# Patient Record
Sex: FEMALE | Race: WHITE | URBAN - METROPOLITAN AREA
[De-identification: names, ages, dates, MRNs, and addresses within clinical notes are randomized per-mention and may not be internally consistent; named-entity substitution may affect disease eponyms.]

---

## 2017-11-15 ENCOUNTER — HOSPITAL ENCOUNTER (OUTPATIENT)
Dept: LAB | Age: 62
Discharge: HOME | End: 2017-11-15
Payer: MEDICAID

## 2017-11-15 ENCOUNTER — OFFICE VISIT (OUTPATIENT)
Dept: FAMILY MEDICINE CLINIC | Age: 62
End: 2017-11-15

## 2017-11-15 VITALS
BODY MASS INDEX: 39.65 KG/M2 | HEIGHT: 61 IN | RESPIRATION RATE: 16 BRPM | DIASTOLIC BLOOD PRESSURE: 72 MMHG | SYSTOLIC BLOOD PRESSURE: 121 MMHG | TEMPERATURE: 97.4 F | WEIGHT: 210 LBS | HEART RATE: 102 BPM | OXYGEN SATURATION: 100 %

## 2017-11-15 DIAGNOSIS — E78.00 HYPERCHOLESTEREMIA: ICD-10-CM

## 2017-11-15 DIAGNOSIS — F20.9 SCHIZOPHRENIA, UNSPECIFIED TYPE (HCC): ICD-10-CM

## 2017-11-15 DIAGNOSIS — M25.50 ARTHRALGIA, UNSPECIFIED JOINT: ICD-10-CM

## 2017-11-15 DIAGNOSIS — Z12.39 BREAST CANCER SCREENING: ICD-10-CM

## 2017-11-15 DIAGNOSIS — I10 ESSENTIAL HYPERTENSION: Primary | ICD-10-CM

## 2017-11-15 DIAGNOSIS — E03.4 HYPOTHYROIDISM DUE TO ACQUIRED ATROPHY OF THYROID: ICD-10-CM

## 2017-11-15 DIAGNOSIS — I10 ESSENTIAL HYPERTENSION: ICD-10-CM

## 2017-11-15 DIAGNOSIS — G89.4 CHRONIC PAIN SYNDROME: ICD-10-CM

## 2017-11-15 DIAGNOSIS — M79.7 FIBROMYALGIA: ICD-10-CM

## 2017-11-15 DIAGNOSIS — Z78.0 POST-MENOPAUSAL: ICD-10-CM

## 2017-11-15 DIAGNOSIS — F20.89 OTHER SCHIZOPHRENIA (HCC): ICD-10-CM

## 2017-11-15 LAB
BASOPHILS # BLD: 0 K/UL (ref 0–0.06)
BASOPHILS NFR BLD: 0 % (ref 0–2)
CHOLEST SERPL-MCNC: 300 MG/DL
DIFFERENTIAL METHOD BLD: ABNORMAL
EOSINOPHIL # BLD: 0.2 K/UL (ref 0–0.4)
EOSINOPHIL NFR BLD: 2 % (ref 0–5)
ERYTHROCYTE [DISTWIDTH] IN BLOOD BY AUTOMATED COUNT: 15.3 % (ref 11.6–14.5)
HCT VFR BLD AUTO: 41.5 % (ref 35–45)
HDLC SERPL-MCNC: 50 MG/DL (ref 40–60)
HDLC SERPL: 6 {RATIO} (ref 0–5)
HGB BLD-MCNC: 13.7 G/DL (ref 12–16)
LDLC SERPL CALC-MCNC: ABNORMAL MG/DL (ref 0–100)
LIPID PROFILE,FLP: ABNORMAL
LYMPHOCYTES # BLD: 2.9 K/UL (ref 0.9–3.6)
LYMPHOCYTES NFR BLD: 29 % (ref 21–52)
MCH RBC QN AUTO: 31 PG (ref 24–34)
MCHC RBC AUTO-ENTMCNC: 33 G/DL (ref 31–37)
MCV RBC AUTO: 93.9 FL (ref 74–97)
MONOCYTES # BLD: 0.7 K/UL (ref 0.05–1.2)
MONOCYTES NFR BLD: 7 % (ref 3–10)
NEUTS SEG # BLD: 6.4 K/UL (ref 1.8–8)
NEUTS SEG NFR BLD: 62 % (ref 40–73)
PLATELET # BLD AUTO: 368 K/UL (ref 135–420)
PMV BLD AUTO: 9.8 FL (ref 9.2–11.8)
RBC # BLD AUTO: 4.42 M/UL (ref 4.2–5.3)
TRIGL SERPL-MCNC: 496 MG/DL (ref ?–150)
TSH SERPL DL<=0.05 MIU/L-ACNC: 4.62 UIU/ML (ref 0.36–3.74)
URATE SERPL-MCNC: 6 MG/DL (ref 2.6–7.2)
VLDLC SERPL CALC-MCNC: ABNORMAL MG/DL
WBC # BLD AUTO: 10.3 K/UL (ref 4.6–13.2)

## 2017-11-15 PROCEDURE — 86038 ANTINUCLEAR ANTIBODIES: CPT | Performed by: INTERNAL MEDICINE

## 2017-11-15 PROCEDURE — 80061 LIPID PANEL: CPT | Performed by: INTERNAL MEDICINE

## 2017-11-15 PROCEDURE — 82652 VIT D 1 25-DIHYDROXY: CPT | Performed by: INTERNAL MEDICINE

## 2017-11-15 PROCEDURE — 84550 ASSAY OF BLOOD/URIC ACID: CPT | Performed by: INTERNAL MEDICINE

## 2017-11-15 PROCEDURE — G0480 DRUG TEST DEF 1-7 CLASSES: HCPCS | Performed by: INTERNAL MEDICINE

## 2017-11-15 PROCEDURE — 36415 COLL VENOUS BLD VENIPUNCTURE: CPT | Performed by: INTERNAL MEDICINE

## 2017-11-15 PROCEDURE — 85025 COMPLETE CBC W/AUTO DIFF WBC: CPT | Performed by: INTERNAL MEDICINE

## 2017-11-15 PROCEDURE — 84443 ASSAY THYROID STIM HORMONE: CPT | Performed by: INTERNAL MEDICINE

## 2017-11-15 PROCEDURE — 86200 CCP ANTIBODY: CPT | Performed by: INTERNAL MEDICINE

## 2017-11-15 RX ORDER — LOSARTAN POTASSIUM 50 MG/1
TABLET ORAL DAILY
COMMUNITY
End: 2018-02-15

## 2017-11-15 RX ORDER — LAMOTRIGINE 100 MG/1
100 TABLET ORAL DAILY
Qty: 30 TAB | Refills: 0 | Status: SHIPPED | OUTPATIENT
Start: 2017-11-15

## 2017-11-15 RX ORDER — NALOXONE HYDROCHLORIDE 4 MG/.1ML
SPRAY NASAL
Qty: 1 EACH | Refills: 2 | Status: SHIPPED | OUTPATIENT
Start: 2017-11-15

## 2017-11-15 RX ORDER — ACETAMINOPHEN AND CODEINE PHOSPHATE 300; 30 MG/1; MG/1
1 TABLET ORAL
Qty: 30 TAB | Refills: 0 | Status: SHIPPED | OUTPATIENT
Start: 2017-11-15

## 2017-11-15 RX ORDER — ATORVASTATIN CALCIUM 40 MG/1
TABLET, FILM COATED ORAL DAILY
COMMUNITY
End: 2017-11-15 | Stop reason: SDUPTHER

## 2017-11-15 RX ORDER — LAMOTRIGINE 100 MG/1
100 TABLET ORAL
COMMUNITY
End: 2017-11-15 | Stop reason: SDUPTHER

## 2017-11-15 RX ORDER — MELOXICAM 15 MG/1
15 TABLET ORAL DAILY
COMMUNITY
End: 2017-11-15 | Stop reason: SDUPTHER

## 2017-11-15 RX ORDER — ACETAMINOPHEN AND CODEINE PHOSPHATE 300; 30 MG/1; MG/1
1 TABLET ORAL
COMMUNITY
End: 2017-11-15 | Stop reason: SDUPTHER

## 2017-11-15 RX ORDER — BENZTROPINE MESYLATE 1 MG/1
TABLET ORAL 2 TIMES DAILY
COMMUNITY
End: 2017-11-15 | Stop reason: SDUPTHER

## 2017-11-15 RX ORDER — ACETAMINOPHEN 500 MG
TABLET ORAL 2 TIMES DAILY
COMMUNITY

## 2017-11-15 RX ORDER — LOSARTAN POTASSIUM 50 MG/1
50 TABLET ORAL DAILY
Qty: 30 TAB | Refills: 1 | Status: SHIPPED | OUTPATIENT
Start: 2017-11-15 | End: 2018-01-31 | Stop reason: SDUPTHER

## 2017-11-15 RX ORDER — HYDROCHLOROTHIAZIDE 25 MG/1
25 TABLET ORAL DAILY
Qty: 30 TAB | Refills: 3 | Status: SHIPPED | OUTPATIENT
Start: 2017-11-15 | End: 2018-03-13 | Stop reason: SDUPTHER

## 2017-11-15 RX ORDER — HYDROCHLOROTHIAZIDE 25 MG/1
25 TABLET ORAL DAILY
COMMUNITY
End: 2017-11-15 | Stop reason: SDUPTHER

## 2017-11-15 RX ORDER — ZIPRASIDONE HYDROCHLORIDE 80 MG/1
80 CAPSULE ORAL 2 TIMES DAILY WITH MEALS
Qty: 60 CAP | Refills: 0 | Status: SHIPPED | OUTPATIENT
Start: 2017-11-15

## 2017-11-15 RX ORDER — ATORVASTATIN CALCIUM 40 MG/1
40 TABLET, FILM COATED ORAL DAILY
Qty: 30 TAB | Refills: 3 | Status: SHIPPED | OUTPATIENT
Start: 2017-11-15 | End: 2018-03-13 | Stop reason: SDUPTHER

## 2017-11-15 RX ORDER — BENZTROPINE MESYLATE 1 MG/1
1 TABLET ORAL 2 TIMES DAILY
Qty: 60 TAB | Refills: 0 | Status: SHIPPED | OUTPATIENT
Start: 2017-11-15 | End: 2018-02-15

## 2017-11-15 RX ORDER — ALPRAZOLAM 0.5 MG/1
0.5 TABLET ORAL
Qty: 30 TAB | Refills: 0 | Status: SHIPPED | OUTPATIENT
Start: 2017-11-15

## 2017-11-15 RX ORDER — BACLOFEN 20 MG/1
20 TABLET ORAL
Qty: 30 TAB | Refills: 3 | Status: SHIPPED | OUTPATIENT
Start: 2017-11-15 | End: 2018-05-21

## 2017-11-15 RX ORDER — VITAMIN E 268 MG
CAPSULE ORAL 2 TIMES DAILY
COMMUNITY
End: 2018-02-15

## 2017-11-15 RX ORDER — ZIPRASIDONE HYDROCHLORIDE 80 MG/1
80 CAPSULE ORAL 2 TIMES DAILY WITH MEALS
COMMUNITY
End: 2017-11-15 | Stop reason: SDUPTHER

## 2017-11-15 RX ORDER — BACLOFEN 20 MG/1
20 TABLET ORAL 3 TIMES DAILY
COMMUNITY
End: 2017-11-15 | Stop reason: SDUPTHER

## 2017-11-15 RX ORDER — ALPRAZOLAM 0.5 MG/1
TABLET ORAL
COMMUNITY
End: 2018-02-15

## 2017-11-15 RX ORDER — MELOXICAM 15 MG/1
15 TABLET ORAL DAILY
Qty: 30 TAB | Refills: 3 | Status: SHIPPED | OUTPATIENT
Start: 2017-11-15 | End: 2018-02-15

## 2017-11-15 RX ORDER — LAMOTRIGINE 25 MG/1
25 TABLET ORAL DAILY
Qty: 30 TAB | Refills: 0 | Status: SHIPPED | OUTPATIENT
Start: 2017-11-15

## 2017-11-15 RX ORDER — LEVOTHYROXINE SODIUM 50 UG/1
TABLET ORAL
COMMUNITY
End: 2017-11-15 | Stop reason: SDUPTHER

## 2017-11-15 RX ORDER — LEVOTHYROXINE SODIUM 50 UG/1
50 TABLET ORAL
Qty: 30 TAB | Refills: 3 | Status: SHIPPED | OUTPATIENT
Start: 2017-11-15 | End: 2018-03-13 | Stop reason: SDUPTHER

## 2017-11-15 NOTE — MR AVS SNAPSHOT
Visit Information Date & Time Provider Department Dept. Phone Encounter #  
 11/15/2017  1:00 PM Sima Dodd, Rachna1 [facility] [phone] [id number] Follow-up Instructions Return in about 1 month (around 12/15/2017). Upcoming Health Maintenance Date Due DTaP/Tdap/Td series (1 - Tdap) 3/15/1976 PAP AKA CERVICAL CYTOLOGY 3/15/1976 BREAST CANCER SCRN MAMMOGRAM 3/15/2005 FOBT Q 1 YEAR AGE 50-75 3/15/2005 ZOSTER VACCINE AGE 60> 1/15/2015 Influenza Age 5 to Adult 8/1/2017 Allergies as of 11/15/2017  Review Complete On: 11/15/2017 By: Sima Dodd MD  
  
 Severity Noted Reaction Type Reactions Penicillins  11/15/2017    Rash Current Immunizations  Never Reviewed No immunizations on file. Not reviewed this visit You Were Diagnosed With   
  
 Codes Comments Essential hypertension    -  Primary ICD-10-CM: I10 
ICD-9-CM: 401.9 Hypercholesteremia     ICD-10-CM: E78.00 ICD-9-CM: 272.0 Hypothyroidism due to acquired atrophy of thyroid     ICD-10-CM: E03.4 ICD-9-CM: 244.8, 246.8 Other schizophrenia ([facility]     ICD-10-CM: F20.89 ICD-9-CM: 295.80 Fibromyalgia     ICD-10-CM: M79.7 ICD-9-CM: 729.1 Chronic pain syndrome     ICD-10-CM: G89.4 ICD-9-CM: 338.4 Schizophrenia, unspecified type ([facility]     ICD-10-CM: F20.9 ICD-9-CM: 295.90 Arthralgia, unspecified joint     ICD-10-CM: M25.50 ICD-9-CM: 719.40 Breast cancer screening     ICD-10-CM: Z12.31 
ICD-9-CM: V76.10 Post-menopausal     ICD-10-CM: Z78.0 ICD-9-CM: V49.81 Vitals BP Pulse Temp Resp Height(growth percentile) Weight(growth percentile) 121/72 (!) 102 97.4 °F (36.3 °C) (Oral) 16 5' 1\" (1.549 m) 210 lb (95.3 kg) SpO2 BMI OB Status Smoking Status 100% 39.68 kg/m2 Postmenopausal Former Smoker Vitals History BMI and BSA Data  Body Mass Index Body Surface Area  
 39.68 kg/m 2 2.03 m 2  
  
  
 Preferred Pharmacy Pharmacy Name Phone [facility] [address] [facility],  [facility] [place] [phone] Your Updated Medication List  
  
   
This list is accurate as of: 11/15/17  2:09 PM.  Always use your most recent med list.  
  
  
  
  
 acetaminophen-codeine 300-30 mg per tablet Commonly known as:  TYLENOL #3 Take 1 Tab by mouth every eight (8) hours as needed for Pain. Max Daily Amount: 3 Tabs. * ALPRAZolam 0.5 mg tablet Commonly known as:  Israel Fajardo Take  by mouth. Take 0.5 tablet - 1 tablet by mouth PRN for anxiety. * ALPRAZolam 0.5 mg tablet Commonly known as:  Israel Fajardo Take 1 Tab by mouth nightly as needed for Anxiety. Max Daily Amount: 0.5 mg.  
  
 atorvastatin 40 mg tablet Commonly known as:  LIPITOR Take 1 Tab by mouth daily. baclofen 20 mg tablet Commonly known as:  LIORESAL Take 1 Tab by mouth nightly. benztropine 1 mg tablet Commonly known as:  COGENTIN Take 1 Tab by mouth two (2) times a day. Cholecalciferol (Vitamin D3) 2,000 unit Cap capsule Commonly known as:  VITAMIN D3 Take  by mouth two (2) times a day. hydroCHLOROthiazide 25 mg tablet Commonly known as:  HYDRODIURIL Take 1 Tab by mouth daily. * lamoTRIgine 100 mg tablet Commonly known as: LaMICtal  
Take 1 Tab by mouth daily. Take 0.5 tab - 1 tab by mouth every night for mood changes * lamoTRIgine 25 mg tablet Commonly known as: LaMICtal  
Take 1 Tab by mouth daily. levothyroxine 50 mcg tablet Commonly known as:  SYNTHROID Take 1 Tab by mouth Daily (before breakfast). * losartan 50 mg tablet Commonly known as:  COZAAR Take  by mouth daily. * losartan 50 mg tablet Commonly known as:  COZAAR Take 1 Tab by mouth daily. meloxicam 15 mg tablet Commonly known as:  MOBIC Take 1 Tab by mouth daily. naloxone 4 mg/actuation nasal spray Commonly known as:  [employer]  
 Use 1 spray intranasally into 1 nostril. Use a new Narcan nasal spray for subsequent doses and administer into alternating nostrils. May repeat every 2 to 3 minutes as needed. vitamin E 400 unit capsule Commonly known as:  [address] Take  by mouth two (2) times a day. ziprasidone 80 mg capsule Commonly known as:  Noreene Guevara Take 1 Cap by mouth two (2) times daily (with meals). * Notice: This list has 6 medication(s) that are the same as other medications prescribed for you. Read the directions carefully, and ask your doctor or other care provider to review them with you. Prescriptions Printed Refills ALPRAZolam (XANAX) 0.5 mg tablet 0 Sig: Take 1 Tab by mouth nightly as needed for Anxiety. Max Daily Amount: 0.5 mg.  
 Class: Print Route: Oral  
 acetaminophen-codeine (TYLENOL #3) 300-30 mg per tablet 0 Sig: Take 1 Tab by mouth every eight (8) hours as needed for Pain. Max Daily Amount: 3 Tabs. Class: Print Route: Oral  
 naloxone (NARCAN) 4 mg/actuation nasal spray 2 Sig: Use 1 spray intranasally into 1 nostril. Use a new Narcan nasal spray for subsequent doses and administer into alternating nostrils. May repeat every 2 to 3 minutes as needed. Class: Print Prescriptions Sent to Pharmacy Refills  
 hydroCHLOROthiazide (HYDRODIURIL) 25 mg tablet 3 Sig: Take 1 Tab by mouth daily. Class: Normal  
 Pharmacy: [facility] #[id number] - [address] Ph #: [phone] Route: Oral  
 lamoTRIgine (LAMICTAL) 100 mg tablet 0 Sig: Take 1 Tab by mouth daily. Take 0.5 tab - 1 tab by mouth every night for mood changes Class: Normal  
 Pharmacy: [facility] #[id number] - [address] Ph #: [phone] Route: Oral  
 ziprasidone (GEODON) 80 mg capsule 0 Sig: Take 1 Cap by mouth two (2) times daily (with meals).   
 Class: Normal  
 Pharmacy: [address] [place] [address] Ph #: [phone] Route: Oral  
 meloxicam (MOBIC) 15 mg tablet 3 Sig: Take 1 Tab by mouth daily. Class: Normal  
 Pharmacy: [facility] #[id number] [place] [address] Ph #: [phone] Route: Oral  
 baclofen (LIORESAL) 20 mg tablet 3 Sig: Take 1 Tab by mouth nightly. Class: Normal  
 Pharmacy: [facility] #[id number] [place] [address] Ph #: [phone] Route: Oral  
 atorvastatin (LIPITOR) 40 mg tablet 3 Sig: Take 1 Tab by mouth daily. Class: Normal  
 Pharmacy: [facility] #[id number] [place] [address] Ph #: [phone] Route: Oral  
 levothyroxine (SYNTHROID) 50 mcg tablet 3 Sig: Take 1 Tab by mouth Daily (before breakfast). Class: Normal  
 Pharmacy: [facility] #[id number] [place] [address] Ph #: [phone] Route: Oral  
 benztropine (COGENTIN) 1 mg tablet 0 Sig: Take 1 Tab by mouth two (2) times a day. Class: Normal  
 Pharmacy: [facility] #[id number] [place] [address] Ph #: [phone] Route: Oral  
 losartan (COZAAR) 50 mg tablet 1 Sig: Take 1 Tab by mouth daily. Class: Normal  
 Pharmacy: [facility] #4660 [place] [address] Ph #: [phone] Route: Oral  
 lamoTRIgine (LAMICTAL) 25 mg tablet 0 Sig: Take 1 Tab by mouth daily. Class: Normal  
 Pharmacy: [facility] #0892 [place] [address] Ph #: [phone] Route: Oral  
  
We Performed the Following REFERRAL TO PAIN MANAGEMENT [NGB580 Custom] Follow-up Instructions Return in about 1 month (around 12/15/2017). To-Do List   
 11/15/2017 Lab:  LAW QL, W/REFLEX CASCADE   
  
 11/15/2017 Lab:  CBC WITH AUTOMATED DIFF   
  
 11/15/2017 Lab: [address], IGG   
  
 11/15/2017 Imaging:  DEXA BONE DENSITY STUDY AXIAL   
  
 11/15/2017 Lab:  LIPID PANEL   
  
 11/15/2017 Imaging:  KAYLAN MAMMO BI SCREENING INCL CAD   
  
 11/15/2017 Lab:  TSH 3RD GENERATION   
  
 11/15/2017 Lab:  URIC ACID   
  
 11/15/2017 Lab:  VITAMIN D, 1, 25 DIHYDROXY Referral Information Referral ID Referred By Referred To  
  
 [id number] Flaca ADAM Not Available Visits Status Start Date End Date 1 New Request 11/15/17 11/15/18 If your referral has a status of pending review or denied, additional information will be sent to support the outcome of this decision. Introducing [facility]! Baljeet Neil introduces [employer] patient portal. Now you can access parts of your medical record, email your doctor's office, and request medication refills online. 1. In your internet browser, go to [email or web address] 2. Click on the First Time User? Click Here link in the Sign In box. You will see the New Member Sign Up page. 3. Enter your [employer] Access Code exactly as it appears below. You will not need to use this code after youve completed the sign-up process. If you do not sign up before the expiration date, you must request a new code. · [employer] Access Code: [email or web address] Expires: 2/13/2018 12:48 PM 
 
4. Enter the last four digits of your Social Security Number (xxxx) and Date of Birth (mm/dd/yyyy) as indicated and click Submit. You will be taken to the next sign-up page. 5. Create a [employer] ID. This will be your [employer] login ID and cannot be changed, so think of one that is secure and easy to remember. 6. Create a [employer] password. You can change your password at any time. 7. Enter your Password Reset Question and Answer. This can be used at a later time if you forget your password. 8. Enter your e-mail address.  You will receive e-mail notification when new information is available in [employer]. 9. Click Sign Up. You can now view and download portions of your medical record. 10. Click the Download Summary menu link to download a portable copy of your medical information. If you have questions, please visit the Frequently Asked Questions section of the [employer] website. Remember, [employer] is NOT to be used for urgent needs. For medical emergencies, dial 911. Now available from your iPhone and Android! Please provide this summary of care documentation to your next provider. If you have any questions after today's visit, please call [phone].

## 2017-11-15 NOTE — PROGRESS NOTES
[address] [facility], [address] [place] [address]  Dr. Alcira Kimball    Psychiatry @ [place] Dec 22nd, 2017

## 2017-11-16 NOTE — PROGRESS NOTES
Marquis Yadav is a 58 y.o.  female and presents with     Chief Complaint   Patient presents with    Hypertension    Cholesterol Problem    Hypothyroidism    Bipolar    Joint Pain       Pt is here to establish care. Pt is originally from [place] who lived in [place] for past 2 years. She moved a month and half back to [place] to be with her daughtr. Pt has h/o Schizophrenia . Pt used to see Psych in [place] . Pt is runnining out of her PSych meds. Her appt with Psych [facility] is in one month. Pt is requesting refills. Pt also says she has fibromyalgia. Pt pt hurts all over. Pt was seeing pain management  Pt also has anxiety disorder. Pt has h/o HTN, hyperchol and hypothyroidsim. Pt denies chest pain, SOB. Pt denies h/o CAD, CHF,DM. NO recent hospitalization. Pt has not had MMG, DEXA. Pt ha not had colonoscopy . No past medical history on file. No past surgical history on file. Current Outpatient Prescriptions   Medication Sig    vitamin E (AQUA GEMS) 400 unit capsule Take  by mouth two (2) times a day.  ALPRAZolam (XANAX) 0.5 mg tablet Take  by mouth. Take 0.5 tablet - 1 tablet by mouth PRN for anxiety.  Cholecalciferol, Vitamin D3, (VITAMIN D3) 2,000 unit cap capsule Take  by mouth two (2) times a day.  losartan (COZAAR) 50 mg tablet Take  by mouth daily.  hydroCHLOROthiazide (HYDRODIURIL) 25 mg tablet Take 1 Tab by mouth daily.  lamoTRIgine (LAMICTAL) 100 mg tablet Take 1 Tab by mouth daily. Take 0.5 tab - 1 tab by mouth every night for mood changes    ziprasidone (GEODON) 80 mg capsule Take 1 Cap by mouth two (2) times daily (with meals).  meloxicam (MOBIC) 15 mg tablet Take 1 Tab by mouth daily.  baclofen (LIORESAL) 20 mg tablet Take 1 Tab by mouth nightly.  atorvastatin (LIPITOR) 40 mg tablet Take 1 Tab by mouth daily.  levothyroxine (SYNTHROID) 50 mcg tablet Take 1 Tab by mouth Daily (before breakfast).     benztropine (COGENTIN) 1 mg tablet Take 1 Tab by mouth two (2) times a day.  losartan (COZAAR) 50 mg tablet Take 1 Tab by mouth daily.  lamoTRIgine (LAMICTAL) 25 mg tablet Take 1 Tab by mouth daily.  ALPRAZolam (XANAX) 0.5 mg tablet Take 1 Tab by mouth nightly as needed for Anxiety. Max Daily Amount: 0.5 mg.    acetaminophen-codeine (TYLENOL #3) 300-30 mg per tablet Take 1 Tab by mouth every eight (8) hours as needed for Pain. Max Daily Amount: 3 Tabs.  naloxone (NARCAN) 4 mg/actuation nasal spray Use 1 spray intranasally into 1 nostril. Use a new Narcan nasal spray for subsequent doses and administer into alternating nostrils. May repeat every 2 to 3 minutes as needed. No current facility-administered medications for this visit. Health Maintenance   Topic Date Due    Hepatitis C Screening  1955    DTaP/Tdap/Td series (1 - Tdap) 03/15/1976    PAP AKA CERVICAL CYTOLOGY  03/15/1976    BREAST CANCER SCRN MAMMOGRAM  03/15/2005    FOBT Q 1 YEAR AGE 50-75  03/15/2005    ZOSTER VACCINE AGE 60>  01/15/2015    Influenza Age 5 to Adult  Addressed       There is no immunization history on file for this patient. No LMP recorded. Patient is postmenopausal.        Allergies and Intolerances: Allergies   Allergen Reactions    Penicillins Rash       Family History:   No family history on file. Social History:   She  reports that she has quit smoking. She has never used smokeless tobacco.  She  reports that she does not drink alcohol.             Review of Systems:   General: negative for - chills, fatigue, fever, weight change  Psych: positive for - anxiety, depression, irritability or mood swings  ENT: negative for - headaches, hearing change, nasal congestion, oral lesions, sneezing or sore throat  Heme/ Lymph: negative for - bleeding problems, bruising, pallor or swollen lymph nodes  Endo: negative for - hot flashes, polydipsia/polyuria or temperature intolerance  Resp: negative for - cough, shortness of breath or wheezing  CV: negative for - chest pain, edema or palpitations  GI: negative for - abdominal pain, change in bowel habits, constipation, diarrhea or nausea/vomiting  : negative for - dysuria, hematuria, incontinence, pelvic pain or vulvar/vaginal symptoms  MSK: negative for - joint pain, joint swelling or muscle pain, pos for jpint pains  Neuro: negative for - confusion, headaches, seizures or weakness  Derm: negative for - dry skin, hair changes, rash or skin lesion changes          Physical:   Vitals:   Vitals:    11/15/17 1314   BP: 121/72   Pulse: (!) 102   Resp: 16   Temp: 97.4 °F (36.3 °C)   TempSrc: Oral   SpO2: 100%   Weight: 210 lb (95.3 kg)   Height: 5' 1\" (1.549 m)           Exam:   HEENT- atraumatic,normocephalic, awake, oriented, well nourished  Neck - supple,no enlarged lymph nodes, no JVD, no thyromegaly  Chest- CTA, no rhonchi, no crackles  Heart- rrr, no murmurs / gallop/rub, mild tachycardia  Abdomen- soft,BS+,NT, no hepatosplenomegaly  Ext - no c/c/edema   Neuro- no focal deficits. Power 5/5 all extremities  Skin - warm,dry, no obvious rashes. Review of Data:   LABS:   Lab Results   Component Value Date/Time    WBC 10.3 11/15/2017 02:17 PM    HGB 13.7 11/15/2017 02:17 PM    HCT 41.5 11/15/2017 02:17 PM    PLATELET 826 97/78/5604 02:17 PM     No results found for: NA, K, CL, CO2, GLU, BUN, CREA  Lab Results   Component Value Date/Time    Cholesterol, total 300 11/15/2017 02:17 PM    HDL Cholesterol 50 11/15/2017 02:17 PM    LDL, calculated  11/15/2017 02:17 PM     LDL AND VLDL CHOLESTEROL NOT CALCULATED WHEN TRIGLYCERIDES >400 MG/DL OR HDL CHOLESTEROL <20 MG/DL    Triglyceride 496 11/15/2017 02:17 PM     No results found for: GPT        Impression / Plan:        ICD-10-CM ICD-9-CM    1.  Essential hypertension I10 401.9 CBC WITH AUTOMATED DIFF      LIPID PANEL      LAW QL, W/REFLEX CASCADE      CYCLIC CITRUL PEPTIDE AB, IGG      VITAMIN D, 1, 25 DIHYDROXY      hydroCHLOROthiazide (HYDRODIURIL) 25 mg tablet losartan (COZAAR) 50 mg tablet   2. Hypercholesteremia E78.00 272.0 atorvastatin (LIPITOR) 40 mg tablet   3. Hypothyroidism due to acquired atrophy of thyroid E03.4 244.8 TSH 3RD GENERATION     246.8 levothyroxine (SYNTHROID) 50 mcg tablet   4. Other schizophrenia ([facility] [address] F20.89 295.80    5. Fibromyalgia M79.7 729.1 REFERRAL TO PAIN MANAGEMENT   6. Chronic pain syndrome G89.4 338.4 meloxicam (MOBIC) 15 mg tablet      baclofen (LIORESAL) 20 mg tablet      acetaminophen-codeine (TYLENOL #3) 300-30 mg per tablet      naloxone (NARCAN) 4 mg/actuation nasal spray      TOXASSURE SELECT 13 (MW)   7. Schizophrenia, unspecified type (HCC) F20.9 295.90 lamoTRIgine (LAMICTAL) 100 mg tablet      ziprasidone (GEODON) 80 mg capsule      benztropine (COGENTIN) 1 mg tablet      lamoTRIgine (LAMICTAL) 25 mg tablet      ALPRAZolam (XANAX) 0.5 mg tablet   8. Arthralgia, unspecified joint M25.50 719.40 URIC ACID   9. Breast cancer screening Z12.31 V76.10 KAYLAN MAMMO BI SCREENING INCL CAD   8. Post-menopausal Z78.0 V49.81 DEXA BONE DENSITY STUDY AXIAL      reviewed. Informed pt that I will not be able to refill her PSych meds or pain meds . Pt needs to find pain management specialist and keep appt with Psych next month. Pt and her daughter agreed. I only refilled the meds so she wont have relapse of her psych problems and withdrawal symptoms related to pain and anxietry meds. Spent over 60 minutes with pt reviewing her history and addressing her concerns as a new pt to the area. Explained to patient risk benefits of the medications. Advised patient to stop meds if having any side effects. Pt verbalized understanding of the instructions. I have discussed the diagnosis with the patient and the intended plan as seen in the above orders. The patient has received an after-visit summary and questions were answered concerning future plans. I have discussed medication side effects and warnings with the patient as well.  I have reviewed the plan of care with the patient, accepted their input and they are in agreement with the treatment goals. Reviewed plan of care. Patient has provided input and agrees with goals. Follow-up Disposition:  Return in about 1 month (around 12/15/2017).     Valeria Springer MD

## 2017-11-17 LAB
1,25(OH)2D3 SERPL-MCNC: 15.6 PG/ML (ref 19.9–79.3)
ANA SER QL: NEGATIVE
CCP IGA+IGG SERPL IA-ACNC: 17 UNITS (ref 0–19)
SEE BELOW:, 164879: NORMAL

## 2017-11-21 ENCOUNTER — TELEPHONE (OUTPATIENT)
Dept: FAMILY MEDICINE CLINIC | Age: 62
End: 2017-11-21

## 2017-11-21 NOTE — TELEPHONE ENCOUNTER
Pt called in requesting the order for Tylenol with Codeine to be faxed to the pharmacy. [phone].  Please assist.

## 2017-11-22 NOTE — TELEPHONE ENCOUNTER
Prior auth completed and faxed to [phone]. Spoke with insurance and per insurance, approval or denial will take 24 hours. Pt notified. This encounter will be closed.

## 2017-11-27 NOTE — TELEPHONE ENCOUNTER
Per insurance, \"For chronic pain, prescriber must attest that a treatment plan with goals that address benefits and harm has been established with patient and there is a signed agreement. Spoke with provider and provider agree to patient signing agreement until see can bee seen by pain management. Attempt made to call pt, lvm to return call.

## 2017-12-04 LAB — TOXASSURE SELECT 13: NORMAL

## 2018-01-31 DIAGNOSIS — I10 ESSENTIAL HYPERTENSION: ICD-10-CM

## 2018-01-31 RX ORDER — LOSARTAN POTASSIUM 50 MG/1
TABLET ORAL
Qty: 30 TAB | Refills: 0 | Status: SHIPPED | OUTPATIENT
Start: 2018-01-31 | End: 2018-03-13 | Stop reason: SDUPTHER

## 2018-02-15 ENCOUNTER — OFFICE VISIT (OUTPATIENT)
Dept: FAMILY MEDICINE CLINIC | Facility: CLINIC | Age: 63
End: 2018-02-15

## 2018-02-15 VITALS
HEIGHT: 61 IN | BODY MASS INDEX: 42.67 KG/M2 | HEART RATE: 96 BPM | RESPIRATION RATE: 12 BRPM | WEIGHT: 226 LBS | OXYGEN SATURATION: 96 % | DIASTOLIC BLOOD PRESSURE: 80 MMHG | TEMPERATURE: 98.2 F | SYSTOLIC BLOOD PRESSURE: 132 MMHG

## 2018-02-15 DIAGNOSIS — E78.2 MIXED HYPERLIPIDEMIA: ICD-10-CM

## 2018-02-15 DIAGNOSIS — E03.4 HYPOTHYROIDISM DUE TO ACQUIRED ATROPHY OF THYROID: ICD-10-CM

## 2018-02-15 DIAGNOSIS — Z13.31 SCREENING FOR DEPRESSION: ICD-10-CM

## 2018-02-15 DIAGNOSIS — Z13.1 SCREENING FOR DIABETES MELLITUS: ICD-10-CM

## 2018-02-15 DIAGNOSIS — F20.89 OTHER SCHIZOPHRENIA (HCC): ICD-10-CM

## 2018-02-15 DIAGNOSIS — I10 ESSENTIAL HYPERTENSION: Primary | ICD-10-CM

## 2018-02-15 DIAGNOSIS — Z12.39 SCREENING FOR BREAST CANCER: ICD-10-CM

## 2018-02-15 DIAGNOSIS — Z11.59 NEED FOR HEPATITIS C SCREENING TEST: ICD-10-CM

## 2018-02-15 DIAGNOSIS — E66.01 OBESITY, MORBID (HCC): ICD-10-CM

## 2018-02-15 DIAGNOSIS — E55.9 VITAMIN D DEFICIENCY: ICD-10-CM

## 2018-02-15 PROBLEM — E78.00 HYPERCHOLESTEREMIA: Status: RESOLVED | Noted: 2017-11-15 | Resolved: 2018-02-15

## 2018-02-15 RX ORDER — IBUPROFEN 800 MG/1
800 TABLET ORAL
Qty: 90 TAB | Refills: 3 | Status: SHIPPED | OUTPATIENT
Start: 2018-02-15 | End: 2018-05-21 | Stop reason: SDUPTHER

## 2018-02-15 RX ORDER — BENZTROPINE MESYLATE 0.5 MG/1
0.5 TABLET ORAL 2 TIMES DAILY
COMMUNITY

## 2018-02-15 NOTE — MR AVS SNAPSHOT
[address] [place] [address] [phone] 
[phone] Patient: Jorge Luis Loja MRN: [id number] ERA:[id number]/[id number] Visit Information Date & Time Provider Department Dept. Phone Encounter #  
 2/15/2018  2:00 PM Manjeet Torres MD [facility] [phone] [id number] Follow-up Instructions Return in about 4 weeks (around 3/15/2018) for Go over lab/imaging results, Follow up hypertension, Follow up hyperlipidemia. Upcoming Health Maintenance Date Due Hepatitis C Screening 1955 PAP AKA CERVICAL CYTOLOGY 3/15/1976 BREAST CANCER SCRN MAMMOGRAM 3/15/2005 FOBT Q 1 YEAR AGE 50-75 3/15/2005 DTaP/Tdap/Td series (2 - Td) 2/15/2028 Allergies as of 2/15/2018  Review Complete On: 2/15/2018 By: Manjeet Torres MD  
  
 Severity Noted Reaction Type Reactions Penicillins  11/15/2017    Rash Current Immunizations  Never Reviewed No immunizations on file. Not reviewed this visit You Were Diagnosed With   
  
 Codes Comments Essential hypertension    -  Primary ICD-10-CM: I10 
ICD-9-CM: 401.9 Hypothyroidism due to acquired atrophy of thyroid     ICD-10-CM: E03.4 ICD-9-CM: 244.8, 246.8 Mixed hyperlipidemia     ICD-10-CM: E78.2 ICD-9-CM: 272.2 Vitamin D deficiency     ICD-10-CM: E55.9 ICD-9-CM: 268.9 Obesity, morbid ([address]     ICD-10-CM: E66.01 
ICD-9-CM: 278.01 Need for hepatitis C screening test     ICD-10-CM: Z11.59 
ICD-9-CM: V73.89 Screening for depression     ICD-10-CM: Z13.89 ICD-9-CM: V79.0 Screening for breast cancer     ICD-10-CM: Z12.31 
ICD-9-CM: V76.10 Screening for diabetes mellitus     ICD-10-CM: Z13.1 ICD-9-CM: V77.1 Vitals BP Pulse Temp Resp Height(growth percentile) Weight(growth percentile) 132/80 (BP 1 Location: Left arm, BP Patient Position: Sitting) 96 98.2 °F (36.8 °C) (Oral) 12 5' 1\" (1.549 m) 226 lb (102.5 kg) SpO2 BMI OB Status Smoking Status 96% 42.7 kg/m2 Postmenopausal Former Smoker Vitals History BMI and BSA Data Body Mass Index Body Surface Area 42.7 kg/m 2 2.1 m 2 Preferred Pharmacy Pharmacy Name Phone [facility], [address] [place] [address] [phone] Your Updated Medication List  
  
   
This list is accurate as of: 2/15/18  2:59 PM.  Always use your most recent med list.  
  
  
  
  
 acetaminophen-codeine 300-30 mg per tablet Commonly known as:  TYLENOL #3 Take 1 Tab by mouth every eight (8) hours as needed for Pain. Max Daily Amount: 3 Tabs. ALPRAZolam 0.5 mg tablet Commonly known as:  Brandy Kim Take 1 Tab by mouth nightly as needed for Anxiety. Max Daily Amount: 0.5 mg.  
  
 atorvastatin 40 mg tablet Commonly known as:  LIPITOR Take 1 Tab by mouth daily. baclofen 20 mg tablet Commonly known as:  LIORESAL Take 1 Tab by mouth nightly. benztropine 0.5 mg tablet Commonly known as:  COGENTIN Take 0.5 mg by mouth two (2) times a day. Cholecalciferol (Vitamin D3) 2,000 unit Cap capsule Commonly known as:  VITAMIN D3 Take  by mouth two (2) times a day. hydroCHLOROthiazide 25 mg tablet Commonly known as:  HYDRODIURIL Take 1 Tab by mouth daily. ibuprofen 800 mg tablet Commonly known as:  MOTRIN Take 1 Tab by mouth every eight (8) hours as needed for Pain. * lamoTRIgine 100 mg tablet Commonly known as: LaMICtal  
Take 1 Tab by mouth daily. Take 0.5 tab - 1 tab by mouth every night for mood changes * lamoTRIgine 25 mg tablet Commonly known as: LaMICtal  
Take 1 Tab by mouth daily. levothyroxine 50 mcg tablet Commonly known as:  SYNTHROID Take 1 Tab by mouth Daily (before breakfast). losartan 50 mg tablet Commonly known as:  COZAAR  
TAKE ONE TABLET BY MOUTH ONE TIME DAILY  
  
 naloxone 4 mg/actuation nasal spray Commonly known as:  [employer]  
 Use 1 spray intranasally into 1 nostril. Use a new Narcan nasal spray for subsequent doses and administer into alternating nostrils. May repeat every 2 to 3 minutes as needed. ziprasidone 80 mg capsule Commonly known as:  Charli Mari Take 1 Cap by mouth two (2) times daily (with meals). * Notice: This list has 2 medication(s) that are the same as other medications prescribed for you. Read the directions carefully, and ask your doctor or other care provider to review them with you. Prescriptions Sent to Pharmacy Refills  
 ibuprofen (MOTRIN) 800 mg tablet 3 Sig: Take 1 Tab by mouth every eight (8) hours as needed for Pain. Class: Normal  
 Pharmacy: [facility], [address] [place] [address] #: [phone] Route: Oral  
  
We Performed the Following [address] [address] [ [facility]] Follow-up Instructions Return in about 4 weeks (around 3/15/2018) for Go over lab/imaging results, Follow up hypertension, Follow up hyperlipidemia. To-Do List   
 02/15/2018 Lab:  HEMOGLOBIN A1C W/O EAG   
  
 02/15/2018 Lab:  HEPATITIS C AB   
  
 02/15/2018 Imaging:  KAYLAN MAMMO BI SCREENING INCL CAD   
  
 02/15/2018 Lab:  T4, FREE   
  
 02/15/2018 Lab:  TSH 3RD GENERATION   
  
 02/18/2018 Lab:  LIPID PANEL Introducing [facility]! [employer] introduces [employer] patient portal. Now you can access parts of your medical record, email your doctor's office, and request medication refills online. 1. In your internet browser, go to [email or web address] 2. Click on the First Time User? Click Here link in the Sign In box. You will see the New Member Sign Up page. 3. Enter your [employer] Access Code exactly as it appears below. You will not need to use this code after youve completed the sign-up process. If you do not sign up before the expiration date, you must request a new code. · [facility] Access Code: [email or web address] Expires: 5/16/2018  2:04 PM 
 
4. Enter the last four digits of your Social Security Number (xxxx) and Date of Birth (mm/dd/yyyy) as indicated and click Submit. You will be taken to the next sign-up page. 5. Create a [facility] ID. This will be your [facility] login ID and cannot be changed, so think of one that is secure and easy to remember. 6. Create a [facility] password. You can change your password at any time. 7. Enter your Password Reset Question and Answer. This can be used at a later time if you forget your password. 8. Enter your e-mail address. You will receive e-mail notification when new information is available in [address] 9. Click Sign Up. You can now view and download portions of your medical record. 10. Click the Download Summary menu link to download a portable copy of your medical information. If you have questions, please visit the Frequently Asked Questions section of the [facility] website. Remember, [facility] is NOT to be used for urgent needs. For medical emergencies, dial 911. Now available from your iPhone and Android! Please provide this summary of care documentation to your next provider. Your primary care clinician is listed as Val Murrell. If you have any questions after today's visit, please call [phone].

## 2018-02-15 NOTE — ASSESSMENT & PLAN NOTE
Uncontrolled, based on history, physical exam and review of pertinent labs, studies and medications; meds reconciled; lifestyle modifications recommended. Key Obesity Meds             hydroCHLOROthiazide (HYDRODIURIL) 25 mg tablet  (Taking) Take 1 Tab by mouth daily. levothyroxine (SYNTHROID) 50 mcg tablet  (Taking) Take 1 Tab by mouth Daily (before breakfast).         Lab Results   Component Value Date/Time    Cholesterol, total 300 11/15/2017 02:17 PM    HDL Cholesterol 50 11/15/2017 02:17 PM    LDL, calculated  11/15/2017 02:17 PM     LDL AND VLDL CHOLESTEROL NOT CALCULATED WHEN TRIGLYCERIDES >400 MG/DL OR HDL CHOLESTEROL <20 MG/DL    Triglyceride 496 11/15/2017 02:17 PM    TSH 4.62 11/15/2017 02:17 PM

## 2018-02-15 NOTE — PROGRESS NOTES
Chief Complaint   Patient presents with    Hypertension    Cholesterol Problem    Thyroid Problem    Other     Schizophrenia    Weight Management    Arthritis     Vitals:    02/15/18 1409 02/15/18 1433   BP: 141/80 132/80  Comment: manual   BP 1 Location: Right arm Left arm   BP Patient Position: Sitting Sitting   Pulse: 96    Resp: 12    Temp: 98.2 °F (36.8 °C)    TempSrc: Oral    SpO2: 96%    Weight: 226 lb (102.5 kg)    Height: 5' 1\" (1.549 m)      Patient is not fasting. Patient in room # 2. Patient would like to discuss fall. 1. Have you been to the ER, urgent care clinic since your last visit? Hospitalized since your last visit? No    2. Have you seen or consulted any other health care providers outside of the [address] [place] [address] since your last visit? Include any pap smears or colon screening. No     Reviewed. Tdap and Shingles declined by patient. Flowsheet, PHQ, Fall, Learning needs and ADL completed.

## 2018-02-15 NOTE — PATIENT INSTRUCTIONS

## 2018-02-15 NOTE — PROGRESS NOTES
Internal Medicine Progress Note    Today's Date:  2/15/2018   Patient:  Pallavi Milian  Patient :  1955    Subjective:     Chief Complaint   Patient presents with    Hypertension    Cholesterol Problem    Thyroid Problem    Other     Schizophrenia    Weight Management    Arthritis      Hypertension   This is a chronic problem, new to me. BP is at goal. Pt takes lisinopril and HCTZ. Pt reports compliance with these medications. Hyperlipidemia  This is a chronic problem, new to me. This is not at goal. Last FLP was checked on 2017. Pt takes lipitor. Pt reports compliance with this medication. Pt denies any side effects. Hypothyroidism   This is a chronic problem, new to me. This is not at goal. TFT last checked on 2017. Pt takes synthroid. Past Medical History:   Diagnosis Date    Essential hypertension, benign     Fibromyalgia     Hypercholesteremia     Hypothyroidism     Mixed hyperlipidemia 2/15/2018    Schizophrenia ([address]     Vitamin D deficiency 2/15/2018     Past Surgical History:   Procedure Laterality Date    HX  SECTION  1984    2x       reports that she has quit smoking. She has never used smokeless tobacco. She reports that she does not drink alcohol or use illicit drugs. Family History   Problem Relation Age of Onset   [employer] Cancer Mother     Cancer Father     Hypertension Father      Allergies   Allergen Reactions    Penicillins Rash     Review of Systems   Positives in bold  CV:      chest pain, palpitations  PULM:  SOB, wheezing, cough, sputum production    Current Outpatient Meds and Allergies     Current Outpatient Prescriptions on File Prior to Visit   Medication Sig Dispense Refill    losartan (COZAAR) 50 mg tablet TAKE ONE TABLET BY MOUTH ONE TIME DAILY  30 Tab 0    Cholecalciferol, Vitamin D3, (VITAMIN D3) 2,000 unit cap capsule Take  by mouth two (2) times a day.       hydroCHLOROthiazide (HYDRODIURIL) 25 mg tablet Take 1 Tab by mouth daily. 30 Tab 3    lamoTRIgine (LAMICTAL) 100 mg tablet Take 1 Tab by mouth daily. Take 0.5 tab - 1 tab by mouth every night for mood changes 30 Tab 0    ziprasidone (GEODON) 80 mg capsule Take 1 Cap by mouth two (2) times daily (with meals). 60 Cap 0    baclofen (LIORESAL) 20 mg tablet Take 1 Tab by mouth nightly. 30 Tab 3    atorvastatin (LIPITOR) 40 mg tablet Take 1 Tab by mouth daily. 30 Tab 3    levothyroxine (SYNTHROID) 50 mcg tablet Take 1 Tab by mouth Daily (before breakfast). 30 Tab 3    lamoTRIgine (LAMICTAL) 25 mg tablet Take 1 Tab by mouth daily. 30 Tab 0    ALPRAZolam (XANAX) 0.5 mg tablet Take 1 Tab by mouth nightly as needed for Anxiety. Max Daily Amount: 0.5 mg. 30 Tab 0    acetaminophen-codeine (TYLENOL #3) 300-30 mg per tablet Take 1 Tab by mouth every eight (8) hours as needed for Pain. Max Daily Amount: 3 Tabs. 30 Tab 0    naloxone (NARCAN) 4 mg/actuation nasal spray Use 1 spray intranasally into 1 nostril. Use a new Narcan nasal spray for subsequent doses and administer into alternating nostrils. May repeat every 2 to 3 minutes as needed. 1 Each 2     No current facility-administered medications on file prior to visit.       Allergies   Allergen Reactions    Penicillins Rash     Objective:     VS:    Visit Vitals    /80 (BP 1 Location: Left arm, BP Patient Position: Sitting)  Comment: manual    Pulse 96    Temp 98.2 °F (36.8 °C) (Oral)    Resp 12    Ht 5' 1\" (1.549 m)    Wt 226 lb (102.5 kg)    SpO2 96%    BMI 42.7 kg/m2     General:   Well-nourished, well-groomed, pleasant, alert, in no acute distress  Head:  Normocephalic, atraumatic  Ears:  External ears WNL  Nose:  External nares WNL  Psych:  No pressured speech, no abnormal thought content    PHQ over the last two weeks 2/15/2018   Little interest or pleasure in doing things Not at all   Feeling down, depressed or hopeless Not at all   Total Score PHQ 2 0     Hospital Outpatient Visit on 11/15/2017   Component Date Value Ref Range Status    TSH 11/15/2017 4.62* 0.36 - 3.74 uIU/mL Final    WBC 11/15/2017 10.3  4.6 - 13.2 K/uL Final    RBC 11/15/2017 4.42  4.20 - 5.30 M/uL Final    HGB 11/15/2017 13.7  12.0 - 16.0 g/dL Final    HCT 11/15/2017 41.5  35.0 - 45.0 % Final    MCV 11/15/2017 93.9  74.0 - 97.0 FL Final    MCH 11/15/2017 31.0  24.0 - 34.0 PG Final    MCHC 11/15/2017 33.0  31.0 - 37.0 g/dL Final    RDW 11/15/2017 15.3* 11.6 - 14.5 % Final    PLATELET 59/27/6816 170  135 - 420 K/uL Final    MPV 11/15/2017 9.8  9.2 - 11.8 FL Final    NEUTROPHILS 11/15/2017 62  40 - 73 % Final    LYMPHOCYTES 11/15/2017 29  21 - 52 % Final    MONOCYTES 11/15/2017 7  3 - 10 % Final    EOSINOPHILS 11/15/2017 2  0 - 5 % Final    BASOPHILS 11/15/2017 0  0 - 2 % Final    ABS. NEUTROPHILS 11/15/2017 6.4  1.8 - 8.0 K/UL Final    ABS. LYMPHOCYTES 11/15/2017 2.9  0.9 - 3.6 K/UL Final    ABS. MONOCYTES 11/15/2017 0.7  0.05 - 1.2 K/UL Final    ABS. EOSINOPHILS 11/15/2017 0.2  0.0 - 0.4 K/UL Final    ABS. BASOPHILS 11/15/2017 0.0  0.0 - 0.06 K/UL Final    DF 11/15/2017 AUTOMATED    Final    LIPID PROFILE 11/15/2017        Final    Cholesterol, total 11/15/2017 300* <200 MG/DL Final    Triglyceride 11/15/2017 496* <150 MG/DL Final    Comment: The drugs N-acetylcysteine (NAC) and  Metamiszole have been found to cause falsely  low results in this chemical assay. Please  be sure to submit blood samples obtained  BEFORE administration of either of these  drugs to assure correct results.  HDL Cholesterol 11/15/2017 50  40 - 60 MG/DL Final    LDL, calculated 11/15/2017 LDL AND VLDL CHOLESTEROL NOT CALCULATED WHEN TRIGLYCERIDES >400 MG/DL OR HDL CHOLESTEROL <20 MG/DL  0 - 100 MG/DL Final    VLDL, calculated 11/15/2017 Calculation not valid with this patient's other Lipid values.   MG/DL Final    CHOL/HDL Ratio 11/15/2017 6.0* 0 - 5.0   Final    LAW Direct 11/15/2017 NEGATIVE   NEGATIVE   Final    See below 11/15/2017 Comment Final    Comment: (NOTE)  Autoantibody                       Disease Association  ____________________________________________________________                         Condition                  Frequency  _____________________   ________________________   _________  Antinuclear Antibody,    SLE, mixed connective  Direct (LAW-D)           tissue diseases  _____________________   ________________________   _________  dsDNA                    SLE                        40 - 60%  _____________________   ________________________   _________  Chromatin                Drug induced SLE                90%                          SLE                        48 - 97%  _____________________   ________________________   _________  Noble Pierini (Ro)                 SLE                        25 - 35%                          Sjogren's Syndrome         40 - 70%                           Lupus                 100%  _____________________   ________________________   _________  SSB (La)                 SLE                                                        10%                          Sjogren's Syndrome              30%  _____________________   _______________________    _________  Sm (anti-Smith)          SLE                        15 - 30%  _____________________   _______________________    _________  RNP                      Mixed Connective Tissue                          Disease                         95%  (U1 nRNP,                SLE                        30 - 50%  anti-ribonucleoprotein)  Polymyositis and/or                          Dermatomyositis                 20%  _____________________   ________________________   _________  Scl-70 (antiDNA          Scleroderma (diffuse)      20 - 35%  topoisomerase)           Crest                           13%  _____________________   ________________________   _________  Raine-1                     Polymyositis and/or                          Dermatomyositis            20 - 40%  _____________________   ________________________   _________  [place]             Scleroderma -                            Crest                          variant                         80%  _____________________   ________________________   _________  Ribosomal P              SLE                        10 - 20%  Performed At: [address] [id number]  Chapo Greene MD UY:[id number]      CCP Antibodies IgG/IgA 11/15/2017 17  0 - 19 units Final    Comment: (NOTE)                           Negative               <20                           Weak positive      20 - 39                           Moderate positive  40 - 59                           Strong positive        >59  Performed At: [address] [id number]  Chapo Greene MD WH:[id number]      Calcitriol (Vit D 1, 25 di-OH) 11/15/2017 15.6* 19.9 - 79.3 pg/mL Final    Comment: (NOTE)  Performed At: [address] [id number]  Chapo Greene MD OX:[id number]      Uric acid 11/15/2017 6.0  2.6 - 7.2 MG/DL Final    Comment: The drugs N-acetylcysteine (NAC) and  Metamiszole have been found to cause falsely  low results in this chemical assay. Please  be sure to submit blood samples obtained  BEFORE administration of either of these  drugs to assure correct results.       ToxAssure results 11/15/2017 FINAL   Final    Comment: (NOTE)  ====================================================================  TOXASSURE SELECT 13 (MW)  ====================================================================  Test                             Result       Flag       Units  Drug Present   Codeine                        361                     ng/mg creat    Sources of codeine include scheduled prescription medications.  ====================================================================  Test                      Result    Flag   Units      Ref Range   Creatinine              23               mg/dL      >=20  ====================================================================  Declared Medications:  Medication list was not provided.  ====================================================================  For clinical consultation, please call [phone].  ====================================================================  Performed At: [facility]  [address],  [facility]  Tolu Garcia MD RL:[id number]        Assessment/Plan & Orders:         ICD-10-CM ICD-9-CM    1. Essential hypertension I10 401.9    2. Hypothyroidism due to acquired atrophy of thyroid E03.4 244.8 TSH 3RD GENERATION     246.8 T4, FREE   3. Mixed hyperlipidemia E78.2 272.2 LIPID PANEL   4. Vitamin D deficiency E55.9 268.9    5. Obesity, morbid ([facility] E66.01 278.01    6. Need for hepatitis C screening test Z11.59 V73.89 HEPATITIS C AB   7. Screening for depression Z13.89 V79.0 AL DEPRESSION SCREEN ANNUAL   8. Screening for breast cancer Z12.31 V76.10 KAYLAN MAMMO BI SCREENING INCL CAD   9. Screening for diabetes mellitus Z13.1 V77.1 HEMOGLOBIN A1C W/O EAG   10. Other schizophrenia ([facility] F20.89 295.80      Healthy lifestyle has been encouraged including avoidance of tobacco, limiting or avoiding alcohol intake, heart healthy diet which is low in cholesterol and saturated fat and contains fresh fruits, vegetables and whole grains and fiber, regular exercise with goals of 20-30 minutes 3-5 days weekly and maintaining an optimal BMI. Follow up with psychiatry and pain management  Depression screenin/15/18    Follow-up Disposition:  Return in about 4 weeks (around 3/15/2018) for Go over lab/imaging results, Follow up hypertension, Follow up hyperlipidemia. *Patient verbalized understanding and agreement with the plan. Patient was given an after-visit summary. Remberto Fisher.  [id number]  MD [facility] - Internal Medicine  2/15/2018, 2:42 [facility]  [address], [address]  Phone [phone]  Fax [phone]

## 2018-02-15 NOTE — ASSESSMENT & PLAN NOTE
This condition is managed by Specialist.  Key Psychotherapeutic Meds             ziprasidone (GEODON) 80 mg capsule  (Taking) Take 1 Cap by mouth two (2) times daily (with meals). ALPRAZolam (XANAX) 0.5 mg tablet  (Taking) Take 1 Tab by mouth nightly as needed for Anxiety. Max Daily Amount: 0.5 mg.    naloxone (NARCAN) 4 mg/actuation nasal spray Use 1 spray intranasally into 1 nostril. Use a new Narcan nasal spray for subsequent doses and administer into alternating nostrils. May repeat every 2 to 3 minutes as needed. Other Key Behavioral Health Meds             lamoTRIgine (LAMICTAL) 100 mg tablet  (Taking) Take 1 Tab by mouth daily. Take 0.5 tab - 1 tab by mouth every night for mood changes    lamoTRIgine (LAMICTAL) 25 mg tablet  (Taking) Take 1 Tab by mouth daily. acetaminophen-codeine (TYLENOL #3) 300-30 mg per tablet  (Taking) Take 1 Tab by mouth every eight (8) hours as needed for Pain. Max Daily Amount: 3 Tabs.         Lab Results   Component Value Date/Time    TSH 4.62 11/15/2017 02:17 PM    WBC 10.3 11/15/2017 02:17 PM

## 2018-02-18 DIAGNOSIS — E78.2 MIXED HYPERLIPIDEMIA: ICD-10-CM

## 2018-02-20 DIAGNOSIS — E03.4 HYPOTHYROIDISM DUE TO ACQUIRED ATROPHY OF THYROID: ICD-10-CM

## 2018-02-20 DIAGNOSIS — Z13.1 SCREENING FOR DIABETES MELLITUS: ICD-10-CM

## 2018-02-20 DIAGNOSIS — Z11.59 NEED FOR HEPATITIS C SCREENING TEST: ICD-10-CM

## 2018-02-20 DIAGNOSIS — E78.2 MIXED HYPERLIPIDEMIA: ICD-10-CM

## 2018-02-21 PROBLEM — E78.1 HYPERTRIGLYCERIDEMIA: Status: ACTIVE | Noted: 2018-02-21

## 2018-02-21 PROBLEM — E78.2 MIXED HYPERLIPIDEMIA: Status: RESOLVED | Noted: 2018-02-15 | Resolved: 2018-02-21

## 2018-02-21 LAB
AVG GLU, 10930: 178 MG/DL (ref 91–123)
AVG GLU, 10930: 178 MG/DL (ref 91–123)
CHOLEST SERPL-MCNC: 148 MG/DL (ref 110–200)
HBA1C MFR BLD HPLC: 7.8 % (ref 4.8–5.9)
HBA1C MFR BLD HPLC: 7.8 % (ref 4.8–5.9)
HCV AB SER IA-ACNC: NORMAL
HDLC SERPL-MCNC: 2.8 MG/DL (ref 0–5)
HDLC SERPL-MCNC: 53 MG/DL (ref 40–59)
LDLC SERPL CALC-MCNC: 64 MG/DL (ref 50–99)
T4 FREE SERPL-MCNC: 0.9 NG/DL (ref 0.9–1.8)
TRIGL SERPL-MCNC: 155 MG/DL (ref 40–149)
TSH SERPL DL<=0.005 MIU/L-ACNC: 1.49 MCU/ML (ref 0.27–4.2)
TSH SERPL DL<=0.005 MIU/L-ACNC: 1.49 MCU/ML (ref 0.27–4.2)
VLDLC SERPL CALC-MCNC: 31 MG/DL (ref 8–30)

## 2018-02-27 ENCOUNTER — DOCUMENTATION ONLY (OUTPATIENT)
Dept: FAMILY MEDICINE CLINIC | Facility: CLINIC | Age: 63
End: 2018-02-27

## 2018-02-27 DIAGNOSIS — I10 ESSENTIAL HYPERTENSION: ICD-10-CM

## 2018-02-27 LAB
A-G RATIO,AGRAT: 1.8 RATIO (ref 1.1–2.6)
ALBUMIN SERPL-MCNC: 4.8 G/DL (ref 3.5–5)
ALP SERPL-CCNC: 72 U/L (ref 40–120)
ALT SERPL-CCNC: 31 U/L (ref 5–40)
ANION GAP SERPL CALC-SCNC: 27 MMOL/L
AST SERPL W P-5'-P-CCNC: 25 U/L (ref 10–37)
AVG GLU, 10930: 178 MG/DL (ref 91–123)
BILIRUB SERPL-MCNC: 0.9 MG/DL (ref 0.2–1.2)
BUN SERPL-MCNC: 13 MG/DL (ref 6–22)
CALCIUM SERPL-MCNC: 9.6 MG/DL (ref 8.4–10.5)
CHLORIDE SERPL-SCNC: 94 MMOL/L (ref 98–110)
CHOLEST SERPL-MCNC: 148 MG/DL (ref 110–200)
CO2 SERPL-SCNC: 21 MMOL/L (ref 20–32)
CREAT SERPL-MCNC: 0.9 MG/DL (ref 0.8–1.4)
GFRAA, 66117: >60
GFRNA, 66118: 59.6
GLOBULIN,GLOB: 2.6 G/DL (ref 2–4)
GLUCOSE SERPL-MCNC: 168 MG/DL (ref 70–99)
HBA1C MFR BLD HPLC: 7.8 % (ref 4.8–5.9)
HCV AB SER IA-ACNC: NORMAL
HDLC SERPL-MCNC: 2.8 MG/DL (ref 0–5)
HDLC SERPL-MCNC: 53 MG/DL (ref 40–59)
LDLC SERPL CALC-MCNC: 64 MG/DL (ref 50–99)
POTASSIUM SERPL-SCNC: 4.6 MMOL/L (ref 3.5–5.5)
PROT SERPL-MCNC: 7.4 G/DL (ref 6.2–8.1)
SODIUM SERPL-SCNC: 142 MMOL/L (ref 133–145)
T4 FREE SERPL-MCNC: 0.9 NG/DL (ref 0.9–1.8)
TRIGL SERPL-MCNC: 155 MG/DL (ref 40–149)
TSH SERPL DL<=0.005 MIU/L-ACNC: 1.49 MCU/ML (ref 0.27–4.2)
VLDLC SERPL CALC-MCNC: 31 MG/DL (ref 8–30)

## 2018-02-27 NOTE — PROGRESS NOTES
Need to add CBC and CMP to previously ordered labs. This has been ordered. LPN or  to inform the lab.

## 2018-02-28 ENCOUNTER — DOCUMENTATION ONLY (OUTPATIENT)
Dept: FAMILY MEDICINE CLINIC | Facility: CLINIC | Age: 63
End: 2018-02-28

## 2018-02-28 NOTE — PROGRESS NOTES
2/27/18 - Notified lab personnel re: adding CBC and CMP to previously ordered labs per Dr. Melinda Feliz. Staff verbally acknowledges understanding and states will notify lab directly.

## 2018-03-13 DIAGNOSIS — E03.4 HYPOTHYROIDISM DUE TO ACQUIRED ATROPHY OF THYROID: ICD-10-CM

## 2018-03-13 DIAGNOSIS — I10 ESSENTIAL HYPERTENSION: ICD-10-CM

## 2018-03-13 DIAGNOSIS — E78.00 HYPERCHOLESTEREMIA: ICD-10-CM

## 2018-03-13 RX ORDER — LOSARTAN POTASSIUM 50 MG/1
TABLET ORAL
Qty: 90 TAB | Refills: 0 | Status: SHIPPED | OUTPATIENT
Start: 2018-03-13 | End: 2018-05-21

## 2018-03-13 RX ORDER — LEVOTHYROXINE SODIUM 50 UG/1
50 TABLET ORAL
Qty: 90 TAB | Refills: 0 | Status: SHIPPED | OUTPATIENT
Start: 2018-03-13 | End: 2018-05-21 | Stop reason: SDUPTHER

## 2018-03-13 RX ORDER — ATORVASTATIN CALCIUM 40 MG/1
40 TABLET, FILM COATED ORAL DAILY
Qty: 90 TAB | Refills: 0 | Status: SHIPPED | OUTPATIENT
Start: 2018-03-13 | End: 2018-05-21 | Stop reason: SDUPTHER

## 2018-03-13 RX ORDER — MELOXICAM 15 MG/1
15 TABLET ORAL DAILY
Qty: 90 TAB | Refills: 0 | Status: SHIPPED | OUTPATIENT
Start: 2018-03-13 | End: 2018-05-21

## 2018-03-13 RX ORDER — HYDROCHLOROTHIAZIDE 25 MG/1
25 TABLET ORAL DAILY
Qty: 90 TAB | Refills: 0 | Status: SHIPPED | OUTPATIENT
Start: 2018-03-13 | End: 2018-05-21

## 2018-03-13 NOTE — PROGRESS NOTES
Elevated A1c noted suggestive of diabetes. Will need to repeat the test in 3 mo to confirm. Recommend lifestyle modifications for now. Will discuss further at next visit.

## 2018-03-14 ENCOUNTER — TELEPHONE (OUTPATIENT)
Dept: FAMILY MEDICINE CLINIC | Facility: CLINIC | Age: 63
End: 2018-03-14

## 2018-03-14 NOTE — TELEPHONE ENCOUNTER
Spoke with pt in regards to lab results. Two pt identifier's and permission to release verified. Relayed 's notes. Pt acknowledges understanding and voices no concerns at this time. ----- Message from Jozef Aaron MD sent at 3/13/2018  3:20 PM EDT -----  Elevated A1c noted suggestive of diabetes. Will need to repeat the test in 3 mo to confirm. Recommend lifestyle modifications for now. Will discuss further at next visit.

## 2018-05-21 ENCOUNTER — OFFICE VISIT (OUTPATIENT)
Dept: FAMILY MEDICINE CLINIC | Facility: CLINIC | Age: 63
End: 2018-05-21

## 2018-05-21 VITALS
SYSTOLIC BLOOD PRESSURE: 133 MMHG | TEMPERATURE: 97.4 F | HEIGHT: 61 IN | HEART RATE: 78 BPM | OXYGEN SATURATION: 96 % | WEIGHT: 224 LBS | RESPIRATION RATE: 16 BRPM | BODY MASS INDEX: 42.29 KG/M2 | DIASTOLIC BLOOD PRESSURE: 73 MMHG

## 2018-05-21 DIAGNOSIS — Z12.11 SCREEN FOR COLON CANCER: ICD-10-CM

## 2018-05-21 DIAGNOSIS — E03.4 HYPOTHYROIDISM DUE TO ACQUIRED ATROPHY OF THYROID: ICD-10-CM

## 2018-05-21 DIAGNOSIS — E78.00 HYPERCHOLESTEREMIA: ICD-10-CM

## 2018-05-21 DIAGNOSIS — Z00.00 ROUTINE GENERAL MEDICAL EXAMINATION AT A HEALTH CARE FACILITY: Primary | ICD-10-CM

## 2018-05-21 LAB — HBA1C MFR BLD HPLC: 7.2 %

## 2018-05-21 RX ORDER — ATORVASTATIN CALCIUM 40 MG/1
40 TABLET, FILM COATED ORAL DAILY
Qty: 90 TAB | Refills: 3 | Status: SHIPPED | OUTPATIENT
Start: 2018-05-21

## 2018-05-21 RX ORDER — LEVOTHYROXINE SODIUM 50 UG/1
50 TABLET ORAL
Qty: 90 TAB | Refills: 3 | Status: SHIPPED | OUTPATIENT
Start: 2018-05-21

## 2018-05-21 RX ORDER — CYCLOBENZAPRINE HCL 10 MG
10 TABLET ORAL
Qty: 90 TAB | Refills: 3 | Status: SHIPPED | OUTPATIENT
Start: 2018-05-21

## 2018-05-21 RX ORDER — GUAIFENESIN 100 MG/5ML
81 LIQUID (ML) ORAL DAILY
Qty: 90 TAB | Refills: 3 | Status: SHIPPED | OUTPATIENT
Start: 2018-05-21

## 2018-05-21 RX ORDER — IBUPROFEN 800 MG/1
800 TABLET ORAL
Qty: 90 TAB | Refills: 3 | Status: SHIPPED | OUTPATIENT
Start: 2018-05-21

## 2018-05-21 RX ORDER — INSULIN PUMP SYRINGE, 3 ML
EACH MISCELLANEOUS
Qty: 1 KIT | Refills: 0 | Status: SHIPPED | OUTPATIENT
Start: 2018-05-21 | End: 2018-07-01 | Stop reason: SDUPTHER

## 2018-05-21 RX ORDER — LOSARTAN POTASSIUM AND HYDROCHLOROTHIAZIDE 12.5; 5 MG/1; MG/1
1 TABLET ORAL DAILY
Qty: 90 TAB | Refills: 3 | Status: SHIPPED | OUTPATIENT
Start: 2018-05-21

## 2018-05-21 RX ORDER — LANCETS
EACH MISCELLANEOUS
Qty: 100 EACH | Refills: 3 | Status: SHIPPED | OUTPATIENT
Start: 2018-05-21

## 2018-05-21 NOTE — MR AVS SNAPSHOT
[address] [id number] [phone] Patient: Joaquim Mac MRN: [id number] NPZ:[id number] Visit Information Date & Time Provider Department Dept. Phone Encounter #  
 5/21/2018  2:30 PM Mary Ellen Chin MD [facility] [phone] [id number] Upcoming Health Maintenance Date Due  
 PAP AKA CERVICAL CYTOLOGY 3/15/1976 BREAST CANCER SCRN MAMMOGRAM 3/15/2005 FOBT Q 1 YEAR AGE 50-75 3/15/2005 Influenza Age 5 to Adult 8/1/2018 DTaP/Tdap/Td series (2 - Td) 2/15/2028 Allergies as of 5/21/2018  Review Complete On: 5/21/2018 By: Mary Ellen Chin MD  
  
 Severity Noted Reaction Type Reactions Penicillins  11/15/2017    Rash Current Immunizations  Never Reviewed No immunizations on file. Not reviewed this visit You Were Diagnosed With   
  
 Codes Comments Routine general medical examination at a health care facility    -  Primary ICD-10-CM: Z00.00 ICD-9-CM: V70.0 Uncontrolled type 2 diabetes mellitus without complication, without long-term current use of insulin ([facility]     ICD-10-CM: E11.65 ICD-9-CM: 250.02 Hypercholesteremia     ICD-10-CM: E78.00 ICD-9-CM: 272.0 Hypothyroidism due to acquired atrophy of thyroid     ICD-10-CM: E03.4 ICD-9-CM: 244.8, 246.8 Screen for colon cancer     ICD-10-CM: Z12.11 ICD-9-CM: V76.51 Vitals BP Pulse Temp Resp Height(growth percentile) Weight(growth percentile) 133/73 (BP 1 Location: Right arm, BP Patient Position: Sitting) 78 97.4 °F (36.3 °C) (Oral) 16 5' 1\" (1.549 m) 224 lb (101.6 kg) SpO2 BMI OB Status Smoking Status 96% 42.32 kg/m2 Postmenopausal Former Smoker BMI and BSA Data Body Mass Index Body Surface Area  
 42.32 kg/m 2 2.09 m 2 Preferred Pharmacy Pharmacy Name Phone [id number] [address] [phone] Your Updated Medication List  
  
   
This list is accurate as of 5/21/18  3:19 PM.  Always use your most recent med list.  
  
  
  
  
 acetaminophen-codeine 300-30 mg per tablet Commonly known as:  TYLENOL #3 Take 1 Tab by mouth every eight (8) hours as needed for Pain. Max Daily Amount: 3 Tabs. ALPRAZolam 0.5 mg tablet Commonly known as:  Locharlene Saul Take 1 Tab by mouth nightly as needed for Anxiety. Max Daily Amount: 0.5 mg.  
  
 aspirin 81 mg chewable tablet Take 1 Tab by mouth daily. atorvastatin 40 mg tablet Commonly known as:  LIPITOR Take 1 Tab by mouth daily. benztropine 0.5 mg tablet Commonly known as:  COGENTIN Take 0.5 mg by mouth two (2) times a day. Blood-Glucose Meter monitoring kit Check BS daily prn Cholecalciferol (Vitamin D3) 2,000 unit Cap capsule Commonly known as:  VITAMIN D3 Take  by mouth two (2) times a day. cyclobenzaprine 10 mg tablet Commonly known as:  FLEXERIL Take 1 Tab by mouth three (3) times daily as needed for Muscle Spasm(s). glucose blood VI test strips strip Commonly known as:  blood glucose test  
Check BS daily prn  
  
 ibuprofen 800 mg tablet Commonly known as:  MOTRIN Take 1 Tab by mouth every eight (8) hours as needed for Pain. * lamoTRIgine 100 mg tablet Commonly known as: LaMICtal  
Take 1 Tab by mouth daily. Take 0.5 tab - 1 tab by mouth every night for mood changes * lamoTRIgine 25 mg tablet Commonly known as: LaMICtal  
Take 1 Tab by mouth daily. Lancets Misc Check BS daily prn  
  
 levothyroxine 50 mcg tablet Commonly known as:  SYNTHROID Take 1 Tab by mouth Daily (before breakfast). losartan-hydroCHLOROthiazide 50-12.5 mg per tablet Commonly known as:  HYZAAR Take 1 Tab by mouth daily. naloxone 4 mg/actuation nasal spray Commonly known as:  [employer] Use 1 spray intranasally into 1 nostril.  Use a new Narcan nasal spray for subsequent doses and administer into alternating nostrils. May repeat every 2 to 3 minutes as needed. ziprasidone 80 mg capsule Commonly known as:  Camilo Somers Take 1 Cap by mouth two (2) times daily (with meals). * Notice: This list has 2 medication(s) that are the same as other medications prescribed for you. Read the directions carefully, and ask your doctor or other care provider to review them with you. Prescriptions Sent to Pharmacy Refills  
 losartan-hydroCHLOROthiazide (HYZAAR) 50-12.5 mg per tablet 3 Sig: Take 1 Tab by mouth daily. Class: Normal  
 Pharmacy: [address] Ph #: [phone] Route: Oral  
 aspirin 81 mg chewable tablet 3 Sig: Take 1 Tab by mouth daily. Class: Normal  
 Pharmacy: [address] Ph #: [phone] Route: Oral  
 atorvastatin (LIPITOR) 40 mg tablet 3 Sig: Take 1 Tab by mouth daily. Class: Normal  
 Pharmacy: [address] Ph #: [phone] Route: Oral  
 levothyroxine (SYNTHROID) 50 mcg tablet 3 Sig: Take 1 Tab by mouth Daily (before breakfast). Class: Normal  
 Pharmacy: [address] Ph #: [phone] Route: Oral  
 ibuprofen (MOTRIN) 800 mg tablet 3 Sig: Take 1 Tab by mouth every eight (8) hours as needed for Pain. Class: Normal  
 Pharmacy: [address] Ph #: [phone] Route: Oral  
 cyclobenzaprine (FLEXERIL) 10 mg tablet 3 Sig: Take 1 Tab by mouth three (3) times daily as needed for Muscle Spasm(s). Class: Normal  
 Pharmacy: [address] Ph #: [phone] Route: Oral  
 Lancets misc 3  Sig: Check BS daily prn  
 Class: Normal  
 Pharmacy: [address], [address] [place] [address] Ph #: [phone] Blood-Glucose Meter monitoring kit 0 Sig: Check BS daily prn  
 Class: Normal  
 Pharmacy: [address], [address] [place] [address] Ph #: [phone]  
 glucose blood VI test strips (BLOOD GLUCOSE TEST) strip 3 Sig: Check BS daily prn  
 Class: Normal  
 Pharmacy: [id number] [facility] [address], [address] [place] [address] Ph #: [phone] We Performed the Following AMB POC HEMOGLOBIN A1C [83448 CPT(R)] To-Do List   
 06/20/2018 Lab:  OCCULT BLOOD, IMMUNOASSAY (FIT) Patient Instructions Information given on ketogenic/intermittent fasting diet Introducing [facility]! Dear Santos Expose: Thank you for requesting a [facility] account. Our records indicate that you already have an active [facility] account. You can access your account anytime at [email or web address] Did you know that you can access your hospital and ER discharge instructions at any time in [facility]? You can also review all of your test results from your hospital stay or ER visit. Additional Information If you have questions, please visit the Frequently Asked Questions section of the [facility] website at [email or web address] Remember, [facility] is NOT to be used for urgent needs. For medical emergencies, dial 911. Now available from your iPhone and Android! Please provide this summary of care documentation to your next provider. Your primary care clinician is listed as Radha Coronado. If you have any questions after today's visit, please call [phone].

## 2018-05-21 NOTE — PROGRESS NOTES
Debbie Torres is 61 y.o. female presents today for office visit for follow up for hypertension, ,hyperlipidemia and weight management. Pt is not fasting. Pt is in Room# 3.    1. Have you been to the ER, urgent care clinic since your last visit? Hospitalized since your last visit? [facility] 4/2018 for UTI. 2. Have you seen or consulted any other health care providers outside of the [address] [place] [address] since your last visit? Include any pap smears or colon screening. NO    Health Maintenance reviewed and patient reminded to schedule colonoscopy and mammogram.       Upcoming Appts  NONE    Requested Prescriptions      No prescriptions requested or ordered in this encounter       There were no vitals taken for this visit.

## 2018-05-21 NOTE — PROGRESS NOTES
History and Physical    Today's Date:  2018   Patient's Name: Mynor Osborn   Patient's :  1955     History:     Chief Complaint   Patient presents with    Hypertension    Cholesterol Problem    Weight Management    Diabetes    Annual Exam    Results     Obesity Class III  This is a chronic problem. This is not at goal. Pt does not exercise regularly. Pt tries to eat a healthy diet. Hypertension   This is a chronic problem. BP is at goal. Pt takes losartan and HCTZ. Pt reports compliance with these medications.     Hyperlipidemia  This is a chronic problem. TG is not at goal. Last FLP was checked on 2018. Pt takes lipitor. Pt reports compliance with this medication. Pt denies any side effects.      Hypothyroidism   This is a chronic problem. This is not at goal. TFT last checked on 2018. Pt takes synthroid. Diabetes mellitus  This is a chronic problem. BS is not at goal. Pt takes no medication for this. Pt is on aspirin, statin and ARB. Past Medical History:   Diagnosis Date    Essential hypertension, benign     Fibromyalgia     Hypercholesteremia     Hypertriglyceridemia 2018    Hypothyroidism     Mixed hyperlipidemia 2/15/2018    Schizophrenia ([facility]     Uncontrolled type 2 diabetes mellitus without complication, without long-term current use of insulin ([facility] 2018    Vitamin D deficiency 2/15/2018     Past Surgical History:   Procedure Laterality Date    HX  SECTION  1984    2x       reports that she has quit smoking. She has never used smokeless tobacco. She reports that she does not drink alcohol or use illicit drugs.   Family History   Problem Relation Age of Onset    Cancer Mother     Cancer Father     Hypertension Father      Allergies   Allergen Reactions    Penicillins Rash     Problem List:      Patient Active Problem List   Diagnosis Code    Hypothyroidism due to acquired atrophy of thyroid E03.4    Essential hypertension I10  Other schizophrenia ([facility] F20.89    Fibromyalgia M79.7    Obesity, morbid ([facility] E66.01    Vitamin D deficiency E55.9    Hypertriglyceridemia E78.1    Uncontrolled type 2 diabetes mellitus without complication, without long-term current use of insulin (HCC) E11.65     Medications:     Current Outpatient Prescriptions   Medication Sig    losartan-hydroCHLOROthiazide (HYZAAR) 50-12.5 mg per tablet Take 1 Tab by mouth daily.  aspirin 81 mg chewable tablet Take 1 Tab by mouth daily.  atorvastatin (LIPITOR) 40 mg tablet Take 1 Tab by mouth daily.  levothyroxine (SYNTHROID) 50 mcg tablet Take 1 Tab by mouth Daily (before breakfast).  ibuprofen (MOTRIN) 800 mg tablet Take 1 Tab by mouth every eight (8) hours as needed for Pain.  cyclobenzaprine (FLEXERIL) 10 mg tablet Take 1 Tab by mouth three (3) times daily as needed for Muscle Spasm(s).  Lancets misc Check BS daily prn    Blood-Glucose Meter monitoring kit Check BS daily prn    glucose blood VI test strips (BLOOD GLUCOSE TEST) strip Check BS daily prn    benztropine (COGENTIN) 0.5 mg tablet Take 0.5 mg by mouth two (2) times a day.  lamoTRIgine (LAMICTAL) 100 mg tablet Take 1 Tab by mouth daily. Take 0.5 tab - 1 tab by mouth every night for mood changes    ziprasidone (GEODON) 80 mg capsule Take 1 Cap by mouth two (2) times daily (with meals).  lamoTRIgine (LAMICTAL) 25 mg tablet Take 1 Tab by mouth daily.  ALPRAZolam (XANAX) 0.5 mg tablet Take 1 Tab by mouth nightly as needed for Anxiety. Max Daily Amount: 0.5 mg.    acetaminophen-codeine (TYLENOL #3) 300-30 mg per tablet Take 1 Tab by mouth every eight (8) hours as needed for Pain. Max Daily Amount: 3 Tabs.  naloxone (NARCAN) 4 mg/actuation nasal spray Use 1 spray intranasally into 1 nostril. Use a new Narcan nasal spray for subsequent doses and administer into alternating nostrils. May repeat every 2 to 3 minutes as needed.     Cholecalciferol, Vitamin D3, (VITAMIN D3) 2,000 unit cap capsule Take  by mouth two (2) times a day. No current facility-administered medications for this visit.       Review of Systems:   (Positives in bold)   General:   fevers, chills, generalized weakness, fatigue, weight change, night sweats, appetite change   Neurologic: dizziness, lightheadedness, headaches, loss of consciousness, numbness, tingling, focal weakness (arms)  Eyes:  vision changes, double vision, photophobia  Ears:  change in hearing, ear pain, ear discharge, ear ringing  Nose:  sneezing, runny nose, nasal congestion (allergic to pollen)  Mouth/Throat: sore throat, voice change, dry mouth, difficulty swallowing  Neck:  pain, stiffness, swelling  Respiratory: dyspnea at rest, dyspnea on exertion, wheezing, cough, sputum production  Cardiovascular:   chest pain, palpitations, pedal edema, leg cramps  Breasts: lumps, discharge, pain, rash, skin changes, changes on self-exam  Gastrointestinal:  nausea, vomiting, abdominal pain, constipation, diarrhea, heart burn, bloody stools, tarry black stools, rectal pain, hemorrhoids  Urinary: dysuria, urinary frequency, nocturia, malodorous urine  Genital (F): vaginal discharge, ulcerations, rashes, change in menses (post menopausal), pelvic pain  Musculoskeletal:  joint pain (all over), joint stiffness, joint swelling, back pain, focal muscle pain, diffuse myalgias  Psychiatric: insomnia, anxiety (on xanax), depression, hallucinations, suicidal ideation, homicidal ideation  Endocrine: polydipsia, polyuria, polyphagia, cold intolerance, heat intolerance  Hematologic: easy bruising, easy bleeding  Dermatologic: Itching, rash    Physical Assessment:   VS:    Visit Vitals    /73 (BP 1 Location: Right arm, BP Patient Position: Sitting)    Pulse 78    Temp 97.4 °F (36.3 °C) (Oral)    Resp 16    Ht 5' 1\" (1.549 m)    Wt 224 lb (101.6 kg)    SpO2 96%    BMI 42.32 kg/m2     General:   Well-groomed, well-nourished, in no distress, pleasant, alert, appropriate and conversant. Eyes:    PERRL, EOMI  Ears:  TMs normal, no ear wax  Mouth:  MMM, good dentition, oropharynx WNL without membranes, exudates, petechiae or ulcers  Neck:   Neck supple, no swelling, mass or tenderness  Cardiovascular:   No JVD. RRR, no MRG. Pulmonary:   Lungs clear bilaterally. Normal respiratory effort. Abdomen:   Abdomen soft, NT, ND, NAB  Extremities:   No edema, LEs warm and well-perfused. Neuro:   Alert and oriented, no focal deficits. No facial asymmetry noted. Skin:    No rash or jaundice  MSK:   Normal ROM, 5/5 muscle strength  Psych:  No pressured speech or abnormal thought content    PHQ over the last two weeks 2/15/2018   Little interest or pleasure in doing things Not at all   Feeling down, depressed or hopeless Not at all   Total Score PHQ 2 0     Office Visit on 05/21/2018   Component Date Value Ref Range Status    Hemoglobin A1c (POC) 05/21/2018 7.2  % Final     Assessment/Plan & Orders:         ICD-10-CM ICD-9-CM    1. Routine general medical examination at a health care facility Z00.00 V70.0    2. Uncontrolled type 2 diabetes mellitus without complication, without long-term current use of insulin (HCC) E11.65 250.02 AMB POC HEMOGLOBIN A1C      Lancets misc      Blood-Glucose Meter monitoring kit      glucose blood VI test strips (BLOOD GLUCOSE TEST) strip   3. Hypercholesteremia E78.00 272.0 atorvastatin (LIPITOR) 40 mg tablet   4. Hypothyroidism due to acquired atrophy of thyroid E03.4 244.8 levothyroxine (SYNTHROID) 50 mcg tablet     246.8    5. Screen for colon cancer Z12.11 V76.51 OCCULT BLOOD, IMMUNOASSAY (FIT)     HM  Colon cancer: colonoscopy due, prefers fit test  Influenza vaccine: due in the fall, pt declines  Pneumococcal vaccine: due at age 72   Tdap: due, pt declines  Herpes Zoster vaccine: due, pt declines  Hep B vaccine: not indicated (liver dz, DM 19-59)  Weight:  Body mass index is 42.32 kg/(m^2). Discussed the patient's BMI with her.   The BMI follow up plan is as follows: Improve diet and 30 min of moderate activity at least 5 times a week  Cervical cancer:  pap smear 2 yrs ago, sees gyn  Breast Cancer: mammogram due  Osteoporosis: No indication for DEXA scan    Healthy lifestyle has been encouraged including avoidance of tobacco, limiting or avoiding alcohol intake, heart healthy diet which is low in cholesterol and saturated fat and contains fresh fruits, vegetables and whole grains and fiber, regular exercise with goals of 20-30 minutes 3-5 days weekly and maintaining an optimal BMI. Information given on ketogenic/intermittent fasting diet  Foot exam and microalbumin/creatinine ratio will be done at the next visit  Pt to get mammogram, eye exam  Depression screenin/15/18    Follow-up Disposition:  Return in about 4 weeks (around 2018) for Follow up hypertension, Follow up diabetes mellitus, Follow up hyperlipidemia, Follow up weight javier. *Patient verbalized understanding and agreement with the plan. Patient was given an after-visit summary. [facility].  Mary Jane Singleton MD - Internal Medicine  2018, 3:04 PM  [address]  [address] [facility] [place], [address]  Phone [phone]  Fax [phone]

## 2018-06-08 DIAGNOSIS — I10 ESSENTIAL HYPERTENSION: ICD-10-CM

## 2018-06-13 RX ORDER — MELOXICAM 15 MG/1
TABLET ORAL
Qty: 60 TAB | Refills: 0 | OUTPATIENT
Start: 2018-06-13

## 2018-06-13 RX ORDER — HYDROCHLOROTHIAZIDE 25 MG/1
TABLET ORAL
Qty: 60 TAB | Refills: 0 | OUTPATIENT
Start: 2018-06-13

## 2018-06-18 NOTE — TELEPHONE ENCOUNTER
[address], [address]  [address] S[address] [facility] [address]  Phone: [phone] Fax: [phone]    and notified both Rx Meloxicam and HCTZ were discontinued. Pharmacy will notify patient of discontinued meds.

## 2018-06-20 DIAGNOSIS — Z12.11 SCREEN FOR COLON CANCER: ICD-10-CM

## 2018-07-19 ENCOUNTER — TELEPHONE (OUTPATIENT)
Dept: FAMILY MEDICINE CLINIC | Facility: CLINIC | Age: 63
End: 2018-07-19

## 2018-07-26 ENCOUNTER — TELEPHONE (OUTPATIENT)
Dept: FAMILY MEDICINE CLINIC | Facility: CLINIC | Age: 63
End: 2018-07-26